# Patient Record
Sex: MALE | Race: WHITE | NOT HISPANIC OR LATINO | Employment: OTHER | URBAN - NONMETROPOLITAN AREA
[De-identification: names, ages, dates, MRNs, and addresses within clinical notes are randomized per-mention and may not be internally consistent; named-entity substitution may affect disease eponyms.]

---

## 2019-10-29 ENCOUNTER — OFFICE VISIT (OUTPATIENT)
Dept: OTOLARYNGOLOGY | Facility: OTHER | Age: 69
End: 2019-10-29
Attending: OTOLARYNGOLOGY
Payer: COMMERCIAL

## 2019-10-29 DIAGNOSIS — H95.192 ENCOUNTER FOR MASTOIDECTOMY CAVITY DEBRIDEMENT, LEFT: Primary | ICD-10-CM

## 2019-10-29 PROCEDURE — G0463 HOSPITAL OUTPT CLINIC VISIT: HCPCS

## 2019-11-07 NOTE — PROGRESS NOTES
document embedded image  Patient Name: Cem Simons    Address: [address] [place] [address]     YOB: 1950    [place] [id number]    MR Number: [id number]    Phone: [phone]  PCP: NONE            Appointment Date: 10/29/19   Visit Provider: Maximiliano Bob MD    cc: ~    ENT Progress Note    Visit Reasons: Left ear cleaning/no hearing issues    HPI  History of Present Illness  Chief complaint:  Mastoid cares left ear    History  The patient is a 69-year-old a [employer]  who is sent to us through the [facility] for assistance with his ears.  He does wear hearing aids bilaterally.  He has had previous wound tympanomastoid surgery on the left in the distant past.  He is here today for assistance with cleaning the bowl.    Exam  The external auditory canal and TM are healthy and normal on the right.  On the left the mastoid bowl is filled with debris.  This was cleared using suction and alligator forceps.  The underlying mastoid bowl and cava minor cavity are healthy without evidence of infection.  Remainder of the head neck exam is unremarkable    Home Medications     - Last Reconciled 10/30/19 by Chelsey Nash CMA    atorvastatin PO  glucosamine sulfate (Glucosamine) PO  multivitamin (Daily Multivitamin) PO  omeprazole PO  tamsulosin PO    Allergies    No Known Allergies Allergy (Unverified 10/30/19 09:30)    PFSH    PFSH:     Medical History (Reviewed 10/30/19 @ 09:30 by Chelsey Nash CMA)    Diagnosis unknown (Acute)  no eCW file  Hearing loss (Acute)    Surgical History (Reviewed 10/30/19 @ 09:30 by Chelsey Nash CMA)    History of ear surgery (Acute)      Social History (Updated 10/30/19 @ 09:31 by Chelsey Nash CMA)  Smoking Status:  Current some day smoker   second hand exposure:  No        A&P  Assessment & Plan  (1) Encounter for mastoidectomy cavity debridement:         Status: Acute        Code(s):  H95.199 - Other disorders following mastoidectomy,  unspecified ear        Qualifiers:          Laterality: left  Qualified Code(s): H95.192 - Other disorders following mastoidectomy, left ear        The patient should be seen yearly for routine mastoid debridement.  I am happy to see him sooner if he has issues.  He is welcome to call if he develops drainage from this mastoid bowl.      Maximiliano Bob MD              10/29/19 1106   <Electronically signed by Maximiliano Bob MD> 11/07/19 0752

## 2022-12-20 ENCOUNTER — OFFICE VISIT (OUTPATIENT)
Dept: OTOLARYNGOLOGY | Facility: OTHER | Age: 72
End: 2022-12-20
Attending: OTOLARYNGOLOGY
Payer: COMMERCIAL

## 2022-12-20 DIAGNOSIS — H95.192 ENCOUNTER FOR MASTOIDECTOMY CAVITY DEBRIDEMENT, LEFT: Primary | ICD-10-CM

## 2022-12-20 PROCEDURE — G0463 HOSPITAL OUTPT CLINIC VISIT: HCPCS

## 2023-01-10 NOTE — PROGRESS NOTES
document embedded image  Patient Name: Cem Simons    Address: [address] [place] [address]     YOB: 1950    [place] [id number]    MR Number: [id number]    Phone: [phone]  PCP: NONE            Appointment Date: 12/20/22   Visit Provider: Maximiliano Bob MD    cc: NONE; ~    ENT Progress Note  Intake  Visit Reasons: Ear cleaning, hearing loss    HPI  History of Present Illness  Chief complaint:  Mastoid cares    History  The patient is a 72-year-old  who is here today for routine mastoid cares on the left.  He is had a wall down mastoid cavity on this eye for years.    Exam  The external auditory canal and TM are clear on the right.  On the left, there is a large amount of debris filling the mastoid bowl.  This was cleared using curettes, alligator forceps, and suction.  There is no evidence of active infection at this time.  Remainder of the head neck exam is unremarkable    Allergies    No Known Allergies Allergy (Unverified 12/21/22 13:12)    PFSH  PFSH:   Past Medical History: (Reviewed 12/21/22 @ 13:12 by Chelsey Nash Med Assist)    Diagnosis unknown  no eCW file  Hearing loss     Past Surgical History: (Reviewed 12/21/22 @ 13:12 by Chelsey Nash Med Assist)    History of ear surgery     Social History: (Updated 12/21/22 @ 13:13 by Chelsey Nash Med Assist)  Smoking Status:  Current some day smoker  second hand exposure:  No  alcohol intake:  current  substance use type:  former substance user       A&P  Assessment & Plan  (1) Encounter for mastoidectomy cavity debridement:        Status: Acute        Code(s):  H95.199 - Other disorders following mastoidectomy, unspecified ear        Qualifiers:          Laterality: left  Qualified Code(s): H95.192 - Other disorders following mastoidectomy, left ear  Follow-up as needed.  The patient was encouraged that he should be seen yearly for routine mastoid cares.      Maximiliano Bob MD    12/20/22 4657    <Electronically  signed by Maximiliano Bob MD> 12/21/22 2835

## 2023-08-29 ENCOUNTER — OFFICE VISIT (OUTPATIENT)
Dept: OTOLARYNGOLOGY | Facility: OTHER | Age: 73
End: 2023-08-29
Attending: OTOLARYNGOLOGY
Payer: COMMERCIAL

## 2023-08-29 DIAGNOSIS — H95.192 ENCOUNTER FOR MASTOIDECTOMY CAVITY DEBRIDEMENT, LEFT: Primary | ICD-10-CM

## 2023-08-29 PROCEDURE — G0463 HOSPITAL OUTPT CLINIC VISIT: HCPCS

## 2023-09-08 NOTE — PROGRESS NOTES
document embedded image  Patient Name: Cme Simons    Address: [address] [place] [address]     YOB: 1950    [place] [id number]    MR Number: [id number]    Phone: [phone]  PCP: NONE            Appointment Date: 08/29/23   Visit Provider: Maximiliano Bob MD    cc: NONE; ~    ENT Progress Note  Intake  Visit Reasons: Ear cleaning    HPI  History of Present Illness  Chief complaint:  Encounter for mastoid cares    History  The patient is a 73-year-old  who comes to the office today for assistance with his mastoid bowl on the left.  He has been using hearing aids bilaterally.  He is finding his left ear isn't working very well.  He is had some occasional low-grade drainage.     Exam  The mastoid bowl was filled with dead skin and purulent debris.  I spent about 15 minutes cleaning his ear with suction, alligator forceps, and curette.  The right external auditory canal and TM are healthy  Remainder of the head neck exam is unremarkable    Allergies    No Known Allergies Allergy (Unverified 12/21/22 13:12)    PFSH  PFSH:   Past Medical History: (Reviewed 12/21/22 @ 13:12 by Chelsey Nash, Med Assist)    Diagnosis unknown  no eCW file  Hearing loss     Past Surgical History: (Reviewed 12/21/22 @ 13:12 by Chelsey Nash Med Assist)    History of ear surgery     Social History: (Updated 12/21/22 @ 13:13 by Chelsey Nash Med Assist)  Smoking Status:  Current some day smoker  second hand exposure:  No  alcohol intake:  current  substance use type:  former substance user       A&P  Assessment & Plan  (1) Encounter for mastoidectomy cavity debridement:        Status: Acute        Code(s):  H95.199 - Other disorders following mastoidectomy, unspecified ear        Qualifiers:          Laterality: left  Qualified Code(s): H95.192 - Other disorders following mastoidectomy, left ear             Plan  We will get him started on an antibiotic drop and have him follow up in a month to make  sure the mastoid bolus settled.  He should refrain from hearing aid use in this ear until we can clear the infection.        Medications:  New  ciprofloxacin-hydrocortisone 0.2-1 % (Cipro HC) 3 drps  Left Ear Q12H 10 days 10 mL 0RF                     Maximiliano Bob MD    Filed: 08/30/23 1038    <Electronically signed by Maximiliano Bob MD> 08/30/23 0263

## 2023-10-03 ENCOUNTER — OFFICE VISIT (OUTPATIENT)
Dept: OTOLARYNGOLOGY | Facility: OTHER | Age: 73
End: 2023-10-03
Attending: OTOLARYNGOLOGY
Payer: COMMERCIAL

## 2023-10-03 DIAGNOSIS — H95.192 ENCOUNTER FOR MASTOIDECTOMY CAVITY DEBRIDEMENT, LEFT: Primary | ICD-10-CM

## 2023-10-03 PROCEDURE — G0463 HOSPITAL OUTPT CLINIC VISIT: HCPCS

## 2023-10-03 NOTE — PROGRESS NOTES
document embedded image  Patient Name: Cem Simons    Address: [address] [place] [address]     YOB: 1950    [place] [id number]    MR Number: [id number]    Phone: [phone]  PCP: NONE            Appointment Date: 10/03/23   Visit Provider: Maximiliano Bob MD    cc: NONE; ~    ENT Progress Note  Intake  Visit Reasons: Ear cleaning    HPI  History of Present Illness  Chief complaint:  Follow-up left mastoid    History  The patient is a 73-year-old  who underwent left wall-down mastoid surgery by Dr. Cross in [place] many years ago.  He sees me for mastoid cares in his here today for routine maintenance.  He is not noticed any drainage.     Exam  The external auditory canal and TM are clean and healthy on the right.  On the left the mastoid bowl is filled with debris.  This was debrided away revealing some underlying purulence drainage.  There is no granulation tissue visible.  The remainder of the head neck exam is unremarkable    Allergies    No Known Allergies Allergy (Unverified 12/21/22 13:12)    PFSH  PFSH:   Past Medical History: (Reviewed 12/21/22 @ 13:12 by Chelsey Nash Med Assist)    Diagnosis unknown  no eCW file  Hearing loss     Past Surgical History: (Reviewed 12/21/22 @ 13:12 by Chelsey Nash Med Assist)    History of ear surgery     Social History: (Updated 12/21/22 @ 13:13 by Chelsey Nash Med Assist)  Smoking Status:  Current some day smoker  second hand exposure:  No  alcohol intake:  current  substance use type:  former substance user       A&P  Assessment & Plan  (1) Encounter for mastoidectomy cavity debridement:        Status: Acute        Code(s):  H95.199 - Other disorders following mastoidectomy, unspecified ear        Qualifiers:          Laterality: left  Qualified Code(s): H95.192 - Other disorders following mastoidectomy, left ear  He was given some drops to settle the drainage and we will continue to see me yearly for debridement, sooner if  needed.                 Medications:  New  ciprofloxacin-hydrocortisone 0.2-1 % (Cipro HC) 3 drps  OTIC Q12H 10 days 10 mL 0RF                     Maximiliano Bob MD    Filed: 10/05/23 1020    <Electronically signed by Maximiliano Bob MD> 10/09/23 0721

## 2025-05-13 ENCOUNTER — OFFICE VISIT (OUTPATIENT)
Dept: OTOLARYNGOLOGY | Facility: OTHER | Age: 75
End: 2025-05-13
Attending: OTOLARYNGOLOGY
Payer: COMMERCIAL

## 2025-05-13 DIAGNOSIS — H95.192 ENCOUNTER FOR MASTOIDECTOMY CAVITY DEBRIDEMENT, LEFT: Primary | ICD-10-CM
